# Patient Record
Sex: MALE | Race: BLACK OR AFRICAN AMERICAN | Employment: FULL TIME | URBAN - METROPOLITAN AREA
[De-identification: names, ages, dates, MRNs, and addresses within clinical notes are randomized per-mention and may not be internally consistent; named-entity substitution may affect disease eponyms.]

---

## 2017-12-28 ENCOUNTER — HOSPITAL ENCOUNTER (OUTPATIENT)
Age: 27
Discharge: HOME | End: 2017-12-28
Attending: FAMILY MEDICINE
Payer: COMMERCIAL

## 2017-12-28 VITALS
OXYGEN SATURATION: 100 % | TEMPERATURE: 98 F | RESPIRATION RATE: 16 BRPM | HEART RATE: 73 BPM | SYSTOLIC BLOOD PRESSURE: 132 MMHG | DIASTOLIC BLOOD PRESSURE: 91 MMHG

## 2017-12-28 DIAGNOSIS — J11.1 FLU SYNDROME: Primary | ICD-10-CM

## 2017-12-28 PROCEDURE — 99211 OFF/OP EST MAY X REQ PHY/QHP: CPT

## 2017-12-28 NOTE — ED INITIAL ASSESSMENT (HPI)
Pt sts awoke on Monday with HA, nasal congestion, cough, fever, body aches/chills. Feeling better today but needs a doctors note to return to work.

## 2017-12-28 NOTE — ED PROVIDER NOTES
[place] [address] [facility] [place]   Patient presents with:  Cough/URI  Fever (infectious)    Stated Complaint: FLU SYMPTOMS    HPI    51-year-old male who presents to the immediate care today requesting a note to return to work. benign. Ears: normal TM's and external ear canals both ears  Nose: Nares normal. Xetu7st midline. Mucosa normal. No drainage or sinus tenderness. . No nasal flaring  Throat: lips, mucosa, and tongue normal; teeth and gums normal. Buccal mucosa moist.   Nec

## 2018-03-10 ENCOUNTER — HOSPITAL ENCOUNTER (OUTPATIENT)
Age: 28
Discharge: HOME | End: 2018-03-10
Payer: COMMERCIAL

## 2018-03-10 VITALS
WEIGHT: 165 LBS | BODY MASS INDEX: 27 KG/M2 | HEART RATE: 61 BPM | OXYGEN SATURATION: 100 % | SYSTOLIC BLOOD PRESSURE: 115 MMHG | TEMPERATURE: 98 F | RESPIRATION RATE: 16 BRPM | DIASTOLIC BLOOD PRESSURE: 59 MMHG

## 2018-03-10 DIAGNOSIS — H61.22 IMPACTED CERUMEN OF LEFT EAR: ICD-10-CM

## 2018-03-10 DIAGNOSIS — J30.2 SEASONAL ALLERGIC RHINITIS, UNSPECIFIED TRIGGER: Primary | ICD-10-CM

## 2018-03-10 PROCEDURE — 69209 REMOVE IMPACTED EAR WAX UNI: CPT

## 2018-03-10 PROCEDURE — 99212 OFFICE O/P EST SF 10 MIN: CPT

## 2018-03-10 PROCEDURE — 99213 OFFICE O/P EST LOW 20 MIN: CPT

## 2018-03-10 NOTE — ED PROVIDER NOTES
Patient Seen in: [id number] [facility] [place]    History   Patient presents with:  Ear Problem Pain (neurosensory)    Stated Complaint: ear problem    HPI    . Patient is a pleasant 49-year-old male.   He arrives for evaluation of possible cerumen imp bilaterally  Cardio: Regular rate and rhythm, normal S1-S2, no murmur appreciable  Extremities: Full ROM, no deformity, NVI  Back: Full range of motion  Skin: No sign of trauma, Skin warm and dry, no induration or sign of infection.    Neuro: Cranial nerves

## 2019-10-30 ENCOUNTER — PATIENT OUTREACH (OUTPATIENT)
Dept: FAMILY MEDICINE CLINIC | Facility: CLINIC | Age: 29
End: 2019-10-30

## 2019-10-30 NOTE — PROGRESS NOTES
Called pt lvm to see if pt is going to be seeing DR. Maxine Crain as his pcp. Pt has never seen Dr. Maxine Crain.

## 2020-01-22 ENCOUNTER — PATIENT OUTREACH (OUTPATIENT)
Dept: FAMILY MEDICINE CLINIC | Facility: CLINIC | Age: 30
End: 2020-01-22

## 2020-02-19 ENCOUNTER — HOSPITAL ENCOUNTER (OUTPATIENT)
Age: 30
Discharge: HOME | End: 2020-02-19
Attending: EMERGENCY MEDICINE
Payer: COMMERCIAL

## 2020-02-19 VITALS
TEMPERATURE: 98 F | OXYGEN SATURATION: 99 % | HEIGHT: 65 IN | BODY MASS INDEX: 26.66 KG/M2 | WEIGHT: 160 LBS | RESPIRATION RATE: 18 BRPM | HEART RATE: 66 BPM | SYSTOLIC BLOOD PRESSURE: 124 MMHG | DIASTOLIC BLOOD PRESSURE: 76 MMHG

## 2020-02-19 DIAGNOSIS — J11.1 INFLUENZA: Primary | ICD-10-CM

## 2020-02-19 LAB
POCT INFLUENZA A: POSITIVE
POCT INFLUENZA B: NEGATIVE

## 2020-02-19 PROCEDURE — 87502 INFLUENZA DNA AMP PROBE: CPT | Performed by: EMERGENCY MEDICINE

## 2020-02-19 PROCEDURE — 99213 OFFICE O/P EST LOW 20 MIN: CPT

## 2020-02-19 PROCEDURE — 99212 OFFICE O/P EST SF 10 MIN: CPT

## 2020-02-19 NOTE — ED INITIAL ASSESSMENT (HPI)
Pt c/o having s/s that started this past Sunday evening of cough, sore throat, body aches, chills and headache. Pt states he felt warm but did not take his temp, last Ibuprofen at 0600 this morning.  Pt states the headache is the main symptom today rates at

## 2020-02-19 NOTE — ED PROVIDER NOTES
Patient Seen in: [facility]      History   Patient presents with:  Headache    Stated Complaint: fever / cough x2 days    HPI    31-year-old year-old male presents emergency room with chief complaint of cough, runny nose so postnasal drip present, oropharynx is clear, uvula midline. Tympanic membranes are clear bilaterally, there is no external auditory canal swelling, there is no mastoid erythema or tenderness. Scalp is atraumatic.   NECK: Neck is supple, there is no nuchal

## 2022-07-27 ENCOUNTER — HOSPITAL ENCOUNTER (OUTPATIENT)
Age: 32
Discharge: HOME | End: 2022-07-27
Payer: COMMERCIAL

## 2022-07-27 VITALS
DIASTOLIC BLOOD PRESSURE: 71 MMHG | HEIGHT: 66 IN | WEIGHT: 145 LBS | OXYGEN SATURATION: 98 % | SYSTOLIC BLOOD PRESSURE: 130 MMHG | TEMPERATURE: 98 F | RESPIRATION RATE: 14 BRPM | BODY MASS INDEX: 23.3 KG/M2 | HEART RATE: 66 BPM

## 2022-07-27 DIAGNOSIS — H01.001 BLEPHARITIS OF RIGHT UPPER EYELID, UNSPECIFIED TYPE: ICD-10-CM

## 2022-07-27 DIAGNOSIS — H00.011 HORDEOLUM EXTERNUM OF RIGHT UPPER EYELID: Primary | ICD-10-CM

## 2022-07-27 PROCEDURE — 99203 OFFICE O/P NEW LOW 30 MIN: CPT | Performed by: NURSE PRACTITIONER

## 2022-07-27 RX ORDER — ERYTHROMYCIN 5 MG/G
1 OINTMENT OPHTHALMIC EVERY 6 HOURS
Qty: 1 G | Refills: 0 | Status: SHIPPED | OUTPATIENT
Start: 2022-07-27 | End: 2022-08-03

## 2022-07-27 NOTE — ED INITIAL ASSESSMENT (HPI)
Pt sts this morning noted swelling/redness/pressure to right upper eyelid. Denies drng. Does not wear contacts.

## 2023-09-20 ENCOUNTER — HOSPITAL ENCOUNTER (OUTPATIENT)
Age: 33
Discharge: HOME | End: 2023-09-20
Payer: COMMERCIAL

## 2023-09-20 VITALS
HEIGHT: 65 IN | BODY MASS INDEX: 26.66 KG/M2 | HEART RATE: 73 BPM | WEIGHT: 160 LBS | DIASTOLIC BLOOD PRESSURE: 76 MMHG | RESPIRATION RATE: 16 BRPM | TEMPERATURE: 98 F | SYSTOLIC BLOOD PRESSURE: 120 MMHG | OXYGEN SATURATION: 99 %

## 2023-09-20 DIAGNOSIS — H00.015 HORDEOLUM EXTERNUM OF LEFT LOWER EYELID: Primary | ICD-10-CM

## 2023-09-20 PROCEDURE — 99213 OFFICE O/P EST LOW 20 MIN: CPT | Performed by: NURSE PRACTITIONER

## 2023-09-20 RX ORDER — ERYTHROMYCIN 5 MG/G
1 OINTMENT OPHTHALMIC EVERY 6 HOURS
Qty: 1 G | Refills: 0 | Status: SHIPPED | OUTPATIENT
Start: 2023-09-20 | End: 2023-09-27

## 2023-09-20 NOTE — ED INITIAL ASSESSMENT (HPI)
Pt c/o pain and swelling to left lower lid since Saturday. Taking benadryl with no relief. Increasing pain and a lump to his left lower eyelid.

## (undated) NOTE — LETTER
[facility] IN [place]  [address] [id number]  Dept: [phone]  Dept Fax: [phone]         December 28, 2017    Patient: Leny Kapadia   YOB: 1990   Date of Visit: 12/28/2017       To Whom It May Concern:

## (undated) NOTE — LETTER
Date & Time: 2/19/2020, 10:11 AM  Patient: Dinorah Tinajero  Encounter Provider(s):    Tres Donahue DO       To Whom It May Concern:    Dinorah Tinajero was seen and treated in our department on 2/19/2020.  He may return to work on 2/21/20 without restricti

## (undated) NOTE — LETTER
Date & Time: 7/27/2022, 9:13 AM  Patient: Antwan Player  Encounter Provider(s):    AVIVA Zhu       To Whom It May Concern:    Sherin Odom was seen and treated in our department on 7/27/2022. He should not return to work until 07/29/2022. If you have any questions or concerns, please do not hesitate to call.         Annabell Oppenheim APRN   Nurse Practitioner